# Patient Record
Sex: FEMALE | Race: WHITE | URBAN - METROPOLITAN AREA
[De-identification: names, ages, dates, MRNs, and addresses within clinical notes are randomized per-mention and may not be internally consistent; named-entity substitution may affect disease eponyms.]

---

## 2018-09-14 ENCOUNTER — OFFICE VISIT (OUTPATIENT)
Dept: FAMILY MEDICINE CLINIC | Age: 28
End: 2018-09-14
Payer: MEDICAID

## 2018-09-14 VITALS
BODY MASS INDEX: 34.55 KG/M2 | DIASTOLIC BLOOD PRESSURE: 60 MMHG | TEMPERATURE: 98.2 F | HEART RATE: 118 BPM | HEIGHT: 61 IN | RESPIRATION RATE: 16 BRPM | OXYGEN SATURATION: 98 % | SYSTOLIC BLOOD PRESSURE: 110 MMHG | WEIGHT: 183 LBS

## 2018-09-14 DIAGNOSIS — T14.8XXA ABRASION: ICD-10-CM

## 2018-09-14 DIAGNOSIS — L01.00 IMPETIGO: Primary | ICD-10-CM

## 2018-09-14 PROCEDURE — 99201 PR OFFICE OUTPATIENT NEW 10 MINUTES: CPT | Performed by: NURSE PRACTITIONER

## 2018-09-14 RX ORDER — CEPHALEXIN 500 MG/1
500 CAPSULE ORAL 4 TIMES DAILY
Qty: 40 CAPSULE | Refills: 0 | Status: SHIPPED | OUTPATIENT
Start: 2018-09-14 | End: 2018-09-24

## 2018-09-14 ASSESSMENT — PATIENT HEALTH QUESTIONNAIRE - PHQ9
2. FEELING DOWN, DEPRESSED OR HOPELESS: 0
SUM OF ALL RESPONSES TO PHQ9 QUESTIONS 1 & 2: 0
SUM OF ALL RESPONSES TO PHQ QUESTIONS 1-9: 0
SUM OF ALL RESPONSES TO PHQ QUESTIONS 1-9: 0
1. LITTLE INTEREST OR PLEASURE IN DOING THINGS: 0

## 2018-09-14 ASSESSMENT — ENCOUNTER SYMPTOMS
EYE PAIN: 0
SINUS PAIN: 0
SINUS PRESSURE: 0
PHOTOPHOBIA: 0
EYE DISCHARGE: 0
COUGH: 0
SORE THROAT: 0
RHINORRHEA: 0
EYE REDNESS: 0
FACIAL SWELLING: 0

## 2018-09-14 NOTE — PROGRESS NOTES
Eyes: Negative for photophobia, pain, discharge and redness. Blind rt eye   Respiratory: Negative for cough. Skin: Positive for rash. All other systems reviewed and are negative. Objective:     Physical Exam   Constitutional: She is oriented to person, place, and time. She appears well-developed and well-nourished. No distress. HENT:   Head: Normocephalic and atraumatic. Nose:       Mouth/Throat: Uvula is midline, oropharynx is clear and moist and mucous membranes are normal. No posterior oropharyngeal erythema. Left nare has crusted flat area. Turbinates are swollen. No drainage noted. No septal hematoma noted   Eyes: Right eye exhibits no discharge. Left eye exhibits no chemosis, no discharge, no exudate and no hordeolum. Left conjunctiva is not injected. No scleral icterus. Left pupil is round and reactive. Hx blindness rt eye,   Left periorbital area- small superficial scratch medial aspect lower. No erythema. No honey color crust. No drainage. Neck: Normal range of motion. Neck supple. Cardiovascular: Normal rate, regular rhythm and normal heart sounds. No murmur heard. Pulmonary/Chest: Effort normal and breath sounds normal. No respiratory distress. She has no wheezes. She has no rales. Abdominal: Soft. Bowel sounds are normal. There is no tenderness. Musculoskeletal: Normal range of motion. She exhibits no edema. No lower leg edema  Normal rom   Lymphadenopathy:     She has no cervical adenopathy. Neurological: She is alert and oriented to person, place, and time. No cranial nerve deficit. Skin: Skin is warm and dry. No rash noted. She is not diaphoretic. Psychiatric: She has a normal mood and affect. Her behavior is normal.   Nursing note and vitals reviewed.     /60 (Site: Left Upper Arm, Position: Sitting, Cuff Size: Medium Adult)   Pulse 118   Temp 98.2 °F (36.8 °C) (Oral)   Resp 16   Ht 5' 1\" (1.549 m)   Wt 183 lb (83 kg)   SpO2 98%

## 2018-09-14 NOTE — PATIENT INSTRUCTIONS
should you call for help? Call your doctor now or seek immediate medical care if:    · You have symptoms of a worse infection, such as:  ¨ Increased pain, swelling, warmth, or redness. ¨ Red streaks leading from the area. ¨ Pus draining from the area. ¨ A fever.     · Impetigo gets worse or spreads to other areas.    Watch closely for changes in your health, and be sure to contact your doctor if:    · You do not get better as expected. Where can you learn more? Go to [email or web address] and sign in to your [employer] account. Enter Y370 in the [employer] box to learn more about \"Impetigo: Care Instructions. \"     If you do not have an account, please click on the \"Sign Up Now\" link. Current as of: May 12, 2017  Content Version: 11.7  © 9929-0484 [employer]. Care instructions adapted under license by [facility] [employer] Formerly [facility] ([facility]). If you have questions about a medical condition or this instruction, always ask your healthcare professional. [place] [address] any warranty or liability for your use of this information. Patient Education          mupirocin nasal  Pronunciation:  mue PIR oh sin  Brand:  Bactroban  What is the most important information I should know about mupirocin nasal?  Follow all directions on your medicine label and package. Tell each of your healthcare providers about all your medical conditions, allergies, and all medicines you use. What is mupirocin nasal?  Mupirocin is an antibiotic that treats or prevents infection caused by bacteria. Mupirocin nasal (for use in the nose) is used to treat bacteria in the nostrils of patients and healthcare workers during an outbreak of severe staph infection within a hospital or other medical setting. This can help prevent the growth of staph bacteria in patients with a high risk of infection. Mupirocin nasal may also be used for purposes not listed in this medication guide.   What should I discuss with liquid medicine with the dosing syringe provided, or with a special dose-measuring spoon or medicine cup. If you do not have a dose-measuring device, ask your pharmacist for one. Use this medicine for the full prescribed length of time. Your symptoms may improve before the infection is completely cleared. Skipping doses may also increase your risk of further infection that is resistant to antibiotics. Cephalexin will not treat a viral infection such as the flu or a common cold. Do not share cephalexin with another person, even if they have the same symptoms you have. This medication can cause you to have unusual results with certain medical tests. Tell any doctor who treats you that you are using cephalexin. Store the tablets and capsules at room temperature away from moisture, heat, and light. Store the liquid medicine in the refrigerator. Throw away any unused liquid after 14 days. What happens if I miss a dose? Take the missed dose as soon as you remember. Skip the missed dose if it is almost time for your next scheduled dose. Do not take extra medicine to make up the missed dose. What happens if I overdose? Seek emergency medical attention or call the Poison Help line at 1-609.437.3048. Overdose symptoms may include nausea, vomiting, stomach pain, diarrhea, and blood in your urine. What should I avoid while taking cephalexin? Antibiotic medicines can cause diarrhea, which may be a sign of a new infection. If you have diarrhea that is watery or bloody, call your doctor. Do not use anti-diarrhea medicine unless your doctor tells you to. What are the possible side effects of cephalexin? Get emergency medical help if you have signs of an allergic reaction: hives; difficult breathing; swelling of your face, lips, tongue, or throat.   Call your doctor at once if you have:  · severe stomach pain, diarrhea that is watery or bloody;  · jaundice (yellowing of the skin or eyes);  · easy bruising, unusual bleeding (nose, mouth, vagina, or rectum), purple or red pinpoint spots under your skin;  · little or no urination;  · agitation, confusion, hallucinations; or  · severe skin reaction --fever, sore throat, swelling in your face or tongue, burning in your eyes, skin pain followed by a red or purple skin rash that spreads (especially in the face or upper body) and causes blistering and peeling. Common side effects may include:  · diarrhea;  · dizziness, feeling tired;  · headache, joint pain; or  · vaginal itching or discharge. This is not a complete list of side effects and others may occur. Call your doctor for medical advice about side effects. You may report side effects to FDA at 1-928-FDA-4760. What other drugs will affect cephalexin? Other drugs may interact with cephalexin, including prescription and over-the-counter medicines, vitamins, and herbal products. Tell each of your health care providers about all medicines you use now and any medicine you start or stop using. Where can I get more information? Your pharmacist can provide more information about cephalexin. Remember, keep this and all other medicines out of the reach of children, never share your medicines with others, and use this medication only for the indication prescribed. Every effort has been made to ensure that the information provided by [address] is accurate, up-to-date, and complete, but no guarantee is made to that effect. Drug information contained herein may be time sensitive. [facility] information has been compiled for use by healthcare practitioners and consumers in the [place] and therefore [employer] does not warrant that uses outside of the [place] are appropriate, unless specifically indicated otherwise. [facility]'s drug information does not endorse drugs, diagnose patients or recommend therapy.  [employer]'s drug information is an informational resource designed to assist licensed healthcare